# Patient Record
Sex: MALE | Race: WHITE | URBAN - METROPOLITAN AREA
[De-identification: names, ages, dates, MRNs, and addresses within clinical notes are randomized per-mention and may not be internally consistent; named-entity substitution may affect disease eponyms.]

---

## 2018-01-24 ENCOUNTER — HOSPITAL ENCOUNTER (OUTPATIENT)
Dept: MRI IMAGING | Age: 58
Discharge: HOME | End: 2018-01-24
Attending: INTERNAL MEDICINE | Admitting: INTERNAL MEDICINE

## 2018-01-24 DIAGNOSIS — C43.4 MALIGNANT MELANOMA OF SCALP AND NECK (HCC): ICD-10-CM

## 2018-01-24 DIAGNOSIS — C43.4 MALIGNANT MELANOMA OF HEAD (HCC): ICD-10-CM

## 2018-09-11 ENCOUNTER — TELEPHONE (OUTPATIENT)
Dept: DERMATOLOGY | Age: 58
End: 2018-09-11

## 2018-09-11 NOTE — TELEPHONE ENCOUNTER
Call #1 was done yesterday 9.10.18   Call #2 for today 9.11.18  Pt was called due to referral from [facility] return call to [facility] appt per referral

## 2018-09-18 ENCOUNTER — TELEPHONE (OUTPATIENT)
Dept: DERMATOLOGY | Age: 58
End: 2018-09-18

## 2020-06-29 ENCOUNTER — NURSE TRIAGE (OUTPATIENT)
Dept: OTHER | Facility: CLINIC | Age: 60
End: 2020-06-29

## 2020-06-29 ENCOUNTER — TELEPHONE (OUTPATIENT)
Dept: FAMILY MEDICINE CLINIC | Age: 60
End: 2020-06-29

## 2020-07-01 PROBLEM — C43.4: Status: ACTIVE | Noted: 2018-06-15

## 2020-07-01 PROBLEM — C77.0 MALIGNANT NEOPLASM METASTATIC TO LYMPH NODE OF NECK (HCC): Status: ACTIVE | Noted: 2020-07-01

## 2020-07-09 ENCOUNTER — TELEPHONE (OUTPATIENT)
Dept: PRIMARY CARE CLINIC | Age: 60
End: 2020-07-09